# Patient Record
Sex: MALE | Race: WHITE | NOT HISPANIC OR LATINO | URBAN - METROPOLITAN AREA
[De-identification: names, ages, dates, MRNs, and addresses within clinical notes are randomized per-mention and may not be internally consistent; named-entity substitution may affect disease eponyms.]

---

## 2017-01-23 ENCOUNTER — OFFICE (OUTPATIENT)
Dept: URBAN - METROPOLITAN AREA CLINIC 6 | Facility: CLINIC | Age: 57
End: 2017-01-23

## 2017-01-23 VITALS
DIASTOLIC BLOOD PRESSURE: 79 MMHG | SYSTOLIC BLOOD PRESSURE: 123 MMHG | TEMPERATURE: 96.5 F | WEIGHT: 163 LBS | HEIGHT: 67 IN | HEART RATE: 87 BPM

## 2017-01-23 DIAGNOSIS — K57.32 DIVERTICULITIS OF LARGE INTESTINE W/O PERFORATION/ABSCESS W/O BLEEDING: ICD-10-CM

## 2017-01-23 PROCEDURE — 99205 OFFICE O/P NEW HI 60 MIN: CPT | Performed by: SPECIALIST

## 2017-01-23 NOTE — SERVICEHPINOTES
56-year-old gentleman was admitted to the hospital 3 weeks ago with acute diverticulitis complicated by abscess formation around the sigmoid colon and peritonitis. The patient had laparoscopy with peritoneal lavage and drainage of an abscess but did not have colon resection. He recovered with antibiotics and gradually improved. At this point, he has become free of pain. His last colonoscopy was in 2009 did not show any diverticular disease. The patient hadn't followed up with surgical consultation is sought a second opinion and he is trying to make a decision on whether or not he should proceed with elective resection of the sigmoid colon.This is a first episode of diverticulitis from this patient.

## 2017-09-15 ENCOUNTER — AMBULATORY SURGICAL CENTER (OUTPATIENT)
Dept: URBAN - METROPOLITAN AREA SURGERY 5 | Facility: SURGERY | Age: 57
End: 2017-09-15

## 2017-09-15 VITALS
RESPIRATION RATE: 20 BRPM | HEART RATE: 75 BPM | OXYGEN SATURATION: 96 % | WEIGHT: 163 LBS | DIASTOLIC BLOOD PRESSURE: 82 MMHG | TEMPERATURE: 98.4 F | HEIGHT: 67 IN | SYSTOLIC BLOOD PRESSURE: 128 MMHG

## 2017-09-15 DIAGNOSIS — K57.30 DVRTCLOS OF LG INT W/O PERFORATION OR ABSCESS W/O BLEEDING: ICD-10-CM

## 2017-09-15 DIAGNOSIS — D12.3 BENIGN NEOPLASM OF TRANSVERSE COLON: ICD-10-CM

## 2017-09-15 PROBLEM — K64.0 FIRST DEGREE HEMORRHOIDS: Status: ACTIVE | Noted: 2017-09-15

## 2017-09-15 LAB — SURGICAL: PDF REPORT: (no result)

## 2017-09-15 PROCEDURE — 45385 COLONOSCOPY W/LESION REMOVAL: CPT | Performed by: SPECIALIST
